# Patient Record
Sex: FEMALE | Race: WHITE | NOT HISPANIC OR LATINO | Employment: OTHER | URBAN - METROPOLITAN AREA
[De-identification: names, ages, dates, MRNs, and addresses within clinical notes are randomized per-mention and may not be internally consistent; named-entity substitution may affect disease eponyms.]

---

## 2017-04-05 ENCOUNTER — ALLSCRIPTS OFFICE VISIT (OUTPATIENT)
Dept: OTHER | Facility: OTHER | Age: 67
End: 2017-04-05

## 2018-01-13 VITALS
DIASTOLIC BLOOD PRESSURE: 80 MMHG | SYSTOLIC BLOOD PRESSURE: 128 MMHG | WEIGHT: 145.94 LBS | HEIGHT: 68 IN | BODY MASS INDEX: 22.12 KG/M2 | HEART RATE: 76 BPM

## 2019-07-24 ENCOUNTER — DOCTOR'S OFFICE (OUTPATIENT)
Dept: URBAN - METROPOLITAN AREA CLINIC 137 | Facility: CLINIC | Age: 69
Setting detail: OPHTHALMOLOGY
End: 2019-07-24
Payer: COMMERCIAL

## 2019-07-24 DIAGNOSIS — G43.101: ICD-10-CM

## 2019-07-24 PROCEDURE — 99202 OFFICE O/P NEW SF 15 MIN: CPT | Performed by: OPTOMETRIST

## 2019-07-24 ASSESSMENT — REFRACTION_MANIFEST
OS_VA1: 20/
OD_VA3: 20/
OD_VA3: 20/
OS_VA3: 20/
OD_VA2: 20/
OD_VA1: 20/
OU_VA: 20/
OS_VA1: 20/
OU_VA: 20/
OS_VA2: 20/
OS_VA3: 20/
OD_VA1: 20/
OS_VA2: 20/
OD_VA2: 20/

## 2019-07-24 ASSESSMENT — REFRACTION_CURRENTRX
OS_OVR_VA: 20/
OD_CYLINDER: -0.75
OS_SPHERE: +2.50
OS_OVR_VA: 20/
OD_VPRISM_DIRECTION: SV
OD_AXIS: 90
OD_OVR_VA: 20/
OD_SPHERE: +2.00
OD_OVR_VA: 20/
OS_VPRISM_DIRECTION: SV
OS_AXIS: 92
OS_CYLINDER: -0.50
OS_OVR_VA: 20/
OD_OVR_VA: 20/

## 2019-07-24 ASSESSMENT — VISUAL ACUITY
OD_BCVA: 20/25-3
OS_BCVA: 20/25-1

## 2019-07-24 ASSESSMENT — CONFRONTATIONAL VISUAL FIELD TEST (CVF)
OD_FINDINGS: FULL
OS_FINDINGS: FULL

## 2019-08-19 ENCOUNTER — DOCTOR'S OFFICE (OUTPATIENT)
Dept: URBAN - METROPOLITAN AREA CLINIC 137 | Facility: CLINIC | Age: 69
Setting detail: OPHTHALMOLOGY
End: 2019-08-19

## 2019-08-19 ENCOUNTER — OPTICAL OFFICE (OUTPATIENT)
Dept: URBAN - METROPOLITAN AREA CLINIC 146 | Facility: CLINIC | Age: 69
Setting detail: OPHTHALMOLOGY
End: 2019-08-19

## 2019-08-19 DIAGNOSIS — H52.223: ICD-10-CM

## 2019-08-19 DIAGNOSIS — H52.4: ICD-10-CM

## 2019-08-19 DIAGNOSIS — H52.03: ICD-10-CM

## 2019-08-19 PROCEDURE — V2744 TINT PHOTOCHROMATIC LENS/ES: HCPCS | Performed by: OPTOMETRIST

## 2019-08-19 PROCEDURE — 92015 DETERMINE REFRACTIVE STATE: CPT | Performed by: OPTOMETRIST

## 2019-08-19 PROCEDURE — V2020 VISION SVCS FRAMES PURCHASES: HCPCS | Performed by: OPTOMETRIST

## 2019-08-19 PROCEDURE — V2781 PROGRESSIVE LENS PER LENS: HCPCS | Performed by: OPTOMETRIST

## 2019-08-19 PROCEDURE — V2750 ANTI-REFLECTIVE COATING: HCPCS | Performed by: OPTOMETRIST

## 2019-08-19 ASSESSMENT — REFRACTION_MANIFEST
OS_VA3: 20/
OS_CYLINDER: -1.00
OS_AXIS: 95
OS_ADD: +2.25
OS_VA1: 20/
OS_SPHERE: +2.50
OU_VA: 20/
OD_VA3: 20/
OS_VA1: 20/20-
OD_CYLINDER: -0.50
OD_VA2: 20/
OD_VA1: 20/20-
OS_VA3: 20/
OD_SPHERE: +1.50
OD_VA3: 20/
OD_ADD: +2.25
OS_VA2: 20/
OD_AXIS: 100
OD_VA2: 20/
OS_VA2: 20/
OD_VA1: 20/
OU_VA: 20/

## 2019-08-19 ASSESSMENT — REFRACTION_CURRENTRX
OD_OVR_VA: 20/
OS_AXIS: 92
OD_AXIS: 94
OS_VPRISM_DIRECTION: SV
OS_CYLINDER: -0.75
OS_OVR_VA: 20/
OD_VPRISM_DIRECTION: SV
OD_OVR_VA: 20/
OD_SPHERE: +2.00
OD_OVR_VA: 20/
OS_OVR_VA: 20/
OS_OVR_VA: 20/
OD_CYLINDER: -0.75
OS_SPHERE: +2.50

## 2019-08-19 ASSESSMENT — REFRACTION_AUTOREFRACTION
OS_CYLINDER: -0.25
OD_CYLINDER: -0.25
OD_SPHERE: +1.50
OS_AXIS: 105
OD_AXIS: 82
OS_SPHERE: +2.00

## 2019-08-19 ASSESSMENT — SPHEQUIV_DERIVED
OD_SPHEQUIV: 1.375
OD_SPHEQUIV: 1.25
OS_SPHEQUIV: 1.875
OS_SPHEQUIV: 2

## 2019-08-19 ASSESSMENT — VISUAL ACUITY
OS_BCVA: 20/25-1
OD_BCVA: 20/30

## 2019-08-19 ASSESSMENT — CONFRONTATIONAL VISUAL FIELD TEST (CVF)
OS_FINDINGS: FULL
OD_FINDINGS: FULL

## 2020-01-15 ENCOUNTER — HOSPITAL ENCOUNTER (OUTPATIENT)
Dept: RADIOLOGY | Facility: HOSPITAL | Age: 70
Discharge: HOME | End: 2020-01-15
Attending: FAMILY MEDICINE
Payer: MEDICARE

## 2020-01-15 ENCOUNTER — TRANSCRIBE ORDERS (OUTPATIENT)
Dept: ADMINISTRATIVE | Facility: HOSPITAL | Age: 70
End: 2020-01-15

## 2020-01-15 DIAGNOSIS — M79.645 PAIN IN FINGER OF LEFT HAND: Primary | ICD-10-CM

## 2020-01-15 DIAGNOSIS — M79.645 PAIN IN FINGER OF LEFT HAND: ICD-10-CM

## 2020-01-15 PROCEDURE — 73140 X-RAY EXAM OF FINGER(S): CPT

## 2020-02-26 ENCOUNTER — OFFICE VISIT (OUTPATIENT)
Dept: OBGYN CLINIC | Facility: HOSPITAL | Age: 70
End: 2020-02-26
Payer: MEDICARE

## 2020-02-26 ENCOUNTER — OFFICE VISIT (OUTPATIENT)
Dept: OCCUPATIONAL THERAPY | Facility: HOSPITAL | Age: 70
End: 2020-02-26
Payer: MEDICARE

## 2020-02-26 VITALS
SYSTOLIC BLOOD PRESSURE: 139 MMHG | DIASTOLIC BLOOD PRESSURE: 86 MMHG | BODY MASS INDEX: 25.01 KG/M2 | WEIGHT: 165 LBS | HEIGHT: 68 IN | HEART RATE: 69 BPM

## 2020-02-26 DIAGNOSIS — M65.332 TRIGGER FINGER, LEFT MIDDLE FINGER: Primary | ICD-10-CM

## 2020-02-26 DIAGNOSIS — M65.341 TRIGGER FINGER, RIGHT RING FINGER: ICD-10-CM

## 2020-02-26 DIAGNOSIS — M65.332 TRIGGER FINGER, LEFT MIDDLE FINGER: ICD-10-CM

## 2020-02-26 PROCEDURE — 99203 OFFICE O/P NEW LOW 30 MIN: CPT | Performed by: ORTHOPAEDIC SURGERY

## 2020-02-26 PROCEDURE — L3933 FO W/O JOINTS CF: HCPCS

## 2020-02-26 RX ORDER — CARVEDILOL 6.25 MG/1
TABLET ORAL
COMMUNITY
Start: 2020-02-23

## 2020-02-26 NOTE — PROGRESS NOTES
Orthosis    Diagnosis:   1  Trigger finger, left middle finger  Ambulatory referral to PT/OT hand therapy   2  Trigger finger, right ring finger  Ambulatory referral to PT/OT hand therapy     Indication: Motion Blocking    Location: R RF, L LF  Supplies: Custom Fit Orthotic  Orthosis type: MCP flexion blocking  Wearing Schedule: Night only  Describe Position: MCPs in full ext    Precautions: Universal (skin contact/breakdown)    Patient or Caregiver expresses understanding of wearing Schedule and Precautions? Yes  Patient or Caregiver able to don/doff orthotic independently? Yes    Written orders provided to patient?  No  Orders Obtained: Written  Orders Obtained from: Dejan Welch    Return for evaluation and treatment No, HEP given today

## 2020-02-26 NOTE — PATIENT INSTRUCTIONS
What is it TRIGGER FINGER? Stenosing tenosynovitis, commonly known as trigger finger or trigger thumb, involves the pulleys and tendons in the hand that bend the fingers  The tendons work like long ropes connecting the muscles of the forearm with the bones of the fingers and thumb  In the finger, the pulleys are a series of rings that form a tunnel through which the tendons must glide, much like the guides on a fishing barbara through which the line (or tendon) must pass  These pulleys hold the tendons close against the bone  The tendons and the tunnel have a slick lining that allows easy gliding of the tendon through the pulleys (see Figure 1)  Trigger finger/thumb occurs when the pulley at the base of the finger becomes too thick and constricting around the tendon, making it hard for the tendon to move freely through the pulley  Sometimes the tendon develops a nodule (knot) or swelling of its lining  Because of the increased resistance to the gliding of the tendon through the  pulley, one may feel pain, popping, or a catching feeling in the finger or thumb (see Figure 2)  When the tendon catches, it produces irritation and more swelling  This causes a vicious cycle of triggering, irritation, and swelling  Sometimes the finger becomes stuck or locked, and is hard to straighten or bend  What causes it? Causes for this condition are not always clear  Some trigger fingers are associated with medical conditions such as rheumatoid arthritis, gout, and diabetes  Local trauma to the palm/base of the finger may be a factor on occasion, but in most cases there is not a clear cause  Signs and symptoms   Trigger finger/thumb may start with discomfort felt at the base of the finger or thumb, where they join the palm  This area is often tender to local pressure  A nodule may sometimes be found in this area   When the finger begins to trigger or lock, the patient may think the  problem is at the middle knuckle of the finger or the tip knuckle of the thumb, since the tendon that is sticking is the one that moves these joints  Treatment  The goal of treatment in trigger finger/thumb is to eliminate the catching or locking and allow full movement of the finger or thumb without discomfort  Swelling around the flexor tendon and tendon sheath must be reduced to allow smooth gliding of the tendon  The wearing of a splint or taking an oral anti-inflammatory medication may sometimes  help  Treatment may also include changing activities to reduce swelling  An injection of steroid into the area around the tendon and pulley is often effective in relieving the trigger finger/thumb  If non-surgical forms of treatment do not relieve the symptoms, surgery may be recommended  This surgery is performed as an outpatient, usually with simple local anesthesia  The goal of surgery is to open the pulley at the base of the finger so that the tendon can glide more freely (see Figure 3)  Active motion of the finger generally begins immediately after surgery  Normal use of the hand can usually be resumed once comfort permits  Some patients may feel tenderness, discomfort, and swelling about the area of their surgery longer than others  Occasionally, hand therapy is required after surgery to regain  better use  © 2012 [employer]  www handcare  org

## 2020-02-26 NOTE — PROGRESS NOTES
maASSESSMENT/PLAN:    Assessment:   L long and R ring TFs    Plan:   Since pt's symptoms have already improved so much, she would like to start with nighttime splints and HEP  Explained NSAIDs prn, heat and massage are also helpful   Pt was educated on steroid injxs and TFR as well    Follow Up:  PRN    General Discussions:  Trigger FInger: The anatomy and physiology of trigger finger was discussed with the patient today in the office  Edema and increased contact pressure within the flexor tendons at the A1 pulley can cause pain, crepitation, and limitation of function  Treatment options include resting MP blocking splints to decrease edema, oral anti-inflammatory medications, home or formal therapy exercises, up to 2 steroid injections within the tendon sheath, or surgical release  While majority of patients do respond to conservative treatment, up to 20% may require surgical release        _____________________________________________________  CHIEF COMPLAINT:  Chief Complaint   Patient presents with    Left Middle Finger - Locking    Right Ring Finger - Locking         SUBJECTIVE:  Norma Hunt is a 81 y o  female who presents with c/o L long and R ring finger pain, stiffness, and clicking  Pt states she took a medication called Ceravital, but started to having aches and swelling after this so d/c'd it  Since this, the finger pain has improved  In addition, 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  PAST MEDICAL HISTORY:  History reviewed  No pertinent past medical history  PAST SURGICAL HISTORY:  Past Surgical History:   Procedure Laterality Date    APPENDECTOMY      BACK SURGERY         FAMILY HISTORY:  History reviewed  No pertinent family history      SOCIAL HISTORY:  Social History     Tobacco Use    Smoking status: Former Smoker    Smokeless tobacco: Never Used   Substance Use Topics    Alcohol use: Not Currently    Drug use: Not Currently MEDICATIONS:    Current Outpatient Medications:     carvedilol (COREG) 6 25 mg tablet, , Disp: , Rfl:     ALLERGIES:  No Known Allergies    REVIEW OF SYSTEMS:  Pertinent items are noted in HPI  A comprehensive review of systems was negative  LABS:  HgA1c: No results found for: HGBA1C  BMP: No results found for: GLUCOSE, CALCIUM, NA, K, CO2, CL, BUN, CREATININE      _____________________________________________________  PHYSICAL EXAMINATION:  Vital signs: /86   Pulse 69   Ht 5' 7 99" (1 727 m)   Wt 74 8 kg (165 lb)   BMI 25 10 kg/m²   General: well developed and well nourished, alert, oriented times 3 and appears comfortable  Psychiatric: Normal  HEENT: Trachea Midline, No torticollis  Cardiovascular: No discernable arrhythmia  Pulmonary: No wheezing or stridor  Skin: No masses, erythema, lacerations, fluctation, ulcerations  Neurovascular: Sensation Intact to the Median, Ulnar, Radial Nerve, Motor Intact to the Median, Ulnar, Radial Nerve and Pulses Intact    MUSCULOSKELETAL EXAMINATION:  LEFT SIDE:  Finger:  Tenderness A1 pulley long, Triggering  long finger and Nodules  long finger  RIGHT SIDE:  Finger:  Tenderness A1 pulley ring, Triggering  ring finger and Nodules  ring finger  Patient with full composite fist formation bilaterally, no evidence of extensor tendon subluxation  No other areas of triggering to the remaining fingers    Negative Finkelstein, negative carpal tunnel compression test and Tinel's bilaterally, no evidence of CMC arthritis   _____________________________________________________  STUDIES REVIEWED:  Images were reviewd in PACS: Previous xray of the L long finger shows no acute osseous abnormality      PROCEDURES PERFORMED:  Procedures  No Procedures performed today   Scribe Attestation    I,:   Lila Hannah PA-C am acting as a scribe while in the presence of the attending physician :        I,:   Vincenzo Zuniga MD personally performed the services described in this documentation    as scribed in my presence :

## 2020-11-16 ENCOUNTER — DOCTOR'S OFFICE (OUTPATIENT)
Dept: URBAN - METROPOLITAN AREA CLINIC 137 | Facility: CLINIC | Age: 70
Setting detail: OPHTHALMOLOGY
End: 2020-11-16
Payer: COMMERCIAL

## 2020-11-16 DIAGNOSIS — H43.811: ICD-10-CM

## 2020-11-16 PROBLEM — G43.101: Status: ACTIVE | Noted: 2019-07-24

## 2020-11-16 PROBLEM — H52.03 HYPEROPIA; BOTH EYES: Status: ACTIVE | Noted: 2019-08-19

## 2020-11-16 PROBLEM — H43.813 VITREOUS DEGENERATION; BOTH EYES: Status: ACTIVE | Noted: 2019-08-19

## 2020-11-16 PROBLEM — H25.13 CATARACT NUCLEAR SCLEROSIS; BOTH EYES: Status: ACTIVE | Noted: 2019-08-19

## 2020-11-16 PROCEDURE — 92014 COMPRE OPH EXAM EST PT 1/>: CPT | Performed by: OPTOMETRIST

## 2020-11-16 ASSESSMENT — REFRACTION_CURRENTRX
OS_AXIS: 92
OS_OVR_VA: 20/
OD_VPRISM_DIRECTION: SV
OS_VPRISM_DIRECTION: SV
OS_SPHERE: +2.50
OD_OVR_VA: 20/
OD_AXIS: 94
OD_CYLINDER: -0.75
OD_SPHERE: +2.00
OS_CYLINDER: -0.75

## 2020-11-16 ASSESSMENT — REFRACTION_AUTOREFRACTION
OD_CYLINDER: -0.25
OS_SPHERE: +2.00
OS_CYLINDER: -0.25
OD_SPHERE: +1.50
OS_AXIS: 105
OD_AXIS: 82

## 2020-11-16 ASSESSMENT — REFRACTION_MANIFEST
OD_ADD: +2.25
OS_SPHERE: +2.50
OD_SPHERE: +1.50
OD_AXIS: 100
OD_CYLINDER: -0.50
OS_CYLINDER: -1.00
OS_AXIS: 95
OS_VA1: 20/20-
OS_ADD: +2.25
OD_VA1: 20/20-

## 2020-11-16 ASSESSMENT — CONFRONTATIONAL VISUAL FIELD TEST (CVF)
OD_FINDINGS: FULL
OS_FINDINGS: FULL

## 2020-11-16 ASSESSMENT — SPHEQUIV_DERIVED
OS_SPHEQUIV: 2
OD_SPHEQUIV: 1.375
OS_SPHEQUIV: 1.875
OD_SPHEQUIV: 1.25

## 2020-11-16 ASSESSMENT — VISUAL ACUITY
OD_BCVA: 20/30
OS_BCVA: 20/25+2

## 2021-01-28 PROBLEM — H93.A1 PULSATILE TINNITUS OF RIGHT EAR: Status: ACTIVE | Noted: 2021-01-28

## 2021-02-11 ENCOUNTER — TRANSCRIBE ORDERS (OUTPATIENT)
Dept: LAB | Facility: CLINIC | Age: 71
End: 2021-02-11

## 2021-02-11 ENCOUNTER — HOSPITAL ENCOUNTER (OUTPATIENT)
Dept: NON INVASIVE DIAGNOSTICS | Facility: CLINIC | Age: 71
Discharge: HOME | End: 2021-02-11
Payer: MEDICARE

## 2021-02-11 DIAGNOSIS — R09.89 OTHER SPECIFIED SYMPTOMS AND SIGNS INVOLVING THE CIRCULATORY AND RESPIRATORY SYSTEMS: ICD-10-CM

## 2021-02-11 DIAGNOSIS — H93.A1 PULSATILE TINNITUS OF RIGHT EAR: ICD-10-CM

## 2021-02-11 PROCEDURE — 93880 EXTRACRANIAL BILAT STUDY: CPT | Performed by: SURGERY

## 2021-02-11 PROCEDURE — 93880 EXTRACRANIAL BILAT STUDY: CPT

## 2021-02-13 DIAGNOSIS — Z23 ENCOUNTER FOR IMMUNIZATION: ICD-10-CM

## 2021-02-17 ENCOUNTER — HOSPITAL ENCOUNTER (OUTPATIENT)
Dept: CT IMAGING | Facility: HOSPITAL | Age: 71
Discharge: HOME | End: 2021-02-17
Payer: MEDICARE

## 2021-02-17 DIAGNOSIS — H93.A1 PULSATILE TINNITUS OF RIGHT EAR: ICD-10-CM

## 2021-02-17 PROCEDURE — 70496 CT ANGIOGRAPHY HEAD: CPT

## 2021-02-17 PROCEDURE — 70498 CT ANGIOGRAPHY NECK: CPT

## 2021-02-17 PROCEDURE — G1004 CDSM NDSC: HCPCS

## 2021-02-17 RX ADMIN — IOHEXOL 85 ML: 350 INJECTION, SOLUTION INTRAVENOUS at 20:10

## 2021-02-23 ENCOUNTER — TELEPHONE (OUTPATIENT)
Dept: VASCULAR SURGERY | Facility: CLINIC | Age: 71
End: 2021-02-23

## 2021-02-24 ENCOUNTER — CONSULT (OUTPATIENT)
Dept: VASCULAR SURGERY | Facility: CLINIC | Age: 71
End: 2021-02-24
Payer: MEDICARE

## 2021-02-24 VITALS
BODY MASS INDEX: 23.72 KG/M2 | DIASTOLIC BLOOD PRESSURE: 92 MMHG | TEMPERATURE: 98.4 F | HEART RATE: 78 BPM | WEIGHT: 147.6 LBS | SYSTOLIC BLOOD PRESSURE: 156 MMHG | HEIGHT: 66 IN

## 2021-02-24 DIAGNOSIS — Z82.49 FAMILY HISTORY OF ABDOMINAL AORTIC ANEURYSM (AAA): ICD-10-CM

## 2021-02-24 DIAGNOSIS — H93.A1 PULSATILE TINNITUS OF RIGHT EAR: Primary | ICD-10-CM

## 2021-02-24 DIAGNOSIS — I10 ESSENTIAL HYPERTENSION: ICD-10-CM

## 2021-02-24 PROCEDURE — 99203 OFFICE O/P NEW LOW 30 MIN: CPT | Performed by: SURGERY

## 2021-02-24 RX ORDER — ESCITALOPRAM OXALATE 10 MG/1
TABLET ORAL
COMMUNITY
Start: 2021-02-18

## 2021-02-24 NOTE — PROGRESS NOTES
Assessment/Plan:    Family history of abdominal aortic aneurysm (AAA)    Prior history of smoking and family history of abdominal aortic aneurysm, will order a screening ultrasound  Hypertension    Daily exercise and controlled weight  He could have white coat hypertension which could cause elevation of blood pressure in doctor's office  Pulsatile tinnitus of right ear    I have reviewed the CT angiogram and carotid Doppler in great detail  I do not see any particular vascular cause that could be causing this  There is no AV all formation or aneurysm  There is no significant carotid artery stenosis  reassurance provided to patient  Total time counseling patient of the imaging findings and further course of action is 40 minutes  Diagnoses and all orders for this visit:    Pulsatile tinnitus of right ear    Essential hypertension  -     CT coronary calcium score; Future    Family history of abdominal aortic aneurysm (AAA)  -     US ABDOMINAL AORTA SCREENING AAA; Future    Other orders  -     escitalopram (LEXAPRO) 10 mg tablet          Subjective:      Patient ID: Thu Grade is a 78 y o  female  Pt is new to our office  Pt was referred here by Dr Go El (ENT)     HPI    Since the last 3 months patient is having ringing sound behind her right ear  It is a washing pulsating sound that she can hear  It usually happens at night  In day-to-day activity she does not hear it  It started after an episode of inner ear infection  Patient has had multiple ear infections over the years  She has had extensive ENT workup done  She also then had a carotid Doppler and a CT angiogram of the head and neck  Patient is here to discuss the findings  The sound continues to happen on a regular basis  She denies any improvement  She denies any worsening  There are no significant past radiating factors that started this or and this   No neuro symptoms such as sudden upper or lower ext weakness, numbness, aphasia, dysarthria, imbalance  Patient has a family history of abdominal aortic aneurysm  Her father passed away from aneurysmal disease, she is unclear of what was the etiology  She also has hypertension  This increases when she is at the doctor's office  She takes Coreg for rate  It improves when she exercises and keeps her weight under control  The following portions of the patient's history were reviewed and updated as appropriate: allergies, current medications, past family history, past medical history, past social history, past surgical history and problem list     Review of Systems   Constitutional: Negative  HENT: Positive for ear pain and tinnitus  Eyes: Negative  Respiratory: Negative  Cardiovascular: Negative  Gastrointestinal: Negative  Endocrine: Negative  Genitourinary: Positive for frequency  Musculoskeletal: Positive for arthralgias, back pain and myalgias  Skin: Negative  Allergic/Immunologic: Negative  Neurological: Negative  Hematological: Negative  Psychiatric/Behavioral: The patient is nervous/anxious  Depression       I have reviewed the review of systems as entered and made appropriate changes as necessary    Objective:      /92 (BP Location: Right arm, Patient Position: Sitting, Cuff Size: Standard)   Pulse 78   Temp 98 4 °F (36 9 °C) (Tympanic)   Ht 5' 5 5" (1 664 m)   Wt 67 kg (147 lb 9 6 oz)   BMI 24 19 kg/m²          Physical Exam  Vitals signs and nursing note reviewed  Constitutional:       Appearance: Normal appearance  HENT:      Head: Normocephalic and atraumatic  Right Ear: External ear normal       Left Ear: External ear normal       Ears:      Comments: No pulsatile masses  Near the ear lobe  Neck:      Musculoskeletal: Normal range of motion and neck supple  No neck rigidity or muscular tenderness  Vascular: No carotid bruit     Cardiovascular:      Rate and Rhythm: Normal rate and regular rhythm  Pulses: Normal pulses  Skin:     Capillary Refill: Capillary refill takes less than 2 seconds  Neurological:      General: No focal deficit present  Mental Status: She is alert and oriented to person, place, and time     Psychiatric:         Mood and Affect: Mood normal          Behavior: Behavior normal

## 2021-02-24 NOTE — ASSESSMENT & PLAN NOTE
Prior history of smoking and family history of abdominal aortic aneurysm, will order a screening ultrasound

## 2021-02-24 NOTE — ASSESSMENT & PLAN NOTE
I have reviewed the CT angiogram and carotid Doppler in great detail  I do not see any particular vascular cause that could be causing this  There is no AV all formation or aneurysm  There is no significant carotid artery stenosis  reassurance provided to patient  Total time counseling patient of the imaging findings and further course of action is 40 minutes

## 2021-03-07 ENCOUNTER — IMMUNIZATIONS (OUTPATIENT)
Dept: FAMILY MEDICINE CLINIC | Facility: HOSPITAL | Age: 71
End: 2021-03-07

## 2021-03-07 DIAGNOSIS — Z23 ENCOUNTER FOR IMMUNIZATION: Primary | ICD-10-CM

## 2021-03-07 PROCEDURE — 91300 SARS-COV-2 / COVID-19 MRNA VACCINE (PFIZER-BIONTECH) 30 MCG: CPT

## 2021-03-07 PROCEDURE — 0001A SARS-COV-2 / COVID-19 MRNA VACCINE (PFIZER-BIONTECH) 30 MCG: CPT

## 2021-03-09 ENCOUNTER — HOSPITAL ENCOUNTER (OUTPATIENT)
Dept: CT IMAGING | Facility: HOSPITAL | Age: 71
Discharge: HOME | End: 2021-03-09
Attending: SURGERY
Payer: MEDICARE

## 2021-03-09 ENCOUNTER — HOSPITAL ENCOUNTER (OUTPATIENT)
Dept: ULTRASOUND IMAGING | Facility: HOSPITAL | Age: 71
Discharge: HOME | End: 2021-03-09
Attending: SURGERY
Payer: MEDICARE

## 2021-03-09 DIAGNOSIS — I10 ESSENTIAL HYPERTENSION: ICD-10-CM

## 2021-03-09 DIAGNOSIS — Z82.49 FAMILY HISTORY OF ABDOMINAL AORTIC ANEURYSM (AAA): ICD-10-CM

## 2021-03-09 PROCEDURE — G1004 CDSM NDSC: HCPCS

## 2021-03-09 PROCEDURE — 75571 CT HRT W/O DYE W/CA TEST: CPT

## 2021-03-09 PROCEDURE — 76706 US ABDL AORTA SCREEN AAA: CPT

## 2021-03-12 ENCOUNTER — TELEPHONE (OUTPATIENT)
Dept: VASCULAR SURGERY | Facility: CLINIC | Age: 71
End: 2021-03-12

## 2021-03-12 NOTE — TELEPHONE ENCOUNTER
Spoke with patient and explained scheduling OV or telemed visit  Transferred to call center to set up

## 2021-03-12 NOTE — TELEPHONE ENCOUNTER
Patient called for interpretation of the coronary calcium test result that dr Prince Casrto had ordered  She read her results in my chart , and she knows that her calcium is too high, but she is asking for an interpretations of her results  I will ask Tu Cordova PA-C to explain   Patient can be reached at [phone]

## 2021-03-14 DIAGNOSIS — R93.1 AGATSTON CORONARY ARTERY CALCIUM SCORE GREATER THAN 400: Primary | ICD-10-CM

## 2021-03-15 NOTE — RESULT ENCOUNTER NOTE
Coronary calcium score is very elevated  She should be started on aspirin and statin  I have referred her to see a cardiologist  Thanks

## 2021-03-17 ENCOUNTER — DOCUMENTATION (OUTPATIENT)
Dept: OTHER | Facility: HOSPITAL | Age: 71
End: 2021-03-17

## 2021-03-17 NOTE — PROGRESS NOTES
Clinical Trial Consent Note  Clinical Trial: [employer] Ultrasound Performance Evaluation study  : Dr Rody Chowdary  Fercho Vasquez was approached on 03/09/2021 for consent to enroll into the [employer] Ultrasound Performance Evaluation study  Consent form was reviewed with the patient, patient was given the opportunity to ask questions  All the patient's questions were answered  The patient verbalized understanding of the study, agreed to proceed with study activities, and signed & dated the informed consent  A copy was provided to the patient for their own future reference  The patient will now begin study as outlined in protocol  See additional documentation for associated visit activities completed  Documentation of Informed Consent Process for Clinical Research Study    Element of Informed Consent Discussed Date Initials/ Person obtaining consent   A statement that the study involves research, an explanation of the purposes of the research and the expected duration of the subject's participation, a description of the procedures to be followed, and identification of any procedures which are experimental 3/09/2021 [facility]   A description of any reasonably foreseeable risks or discomforts to the subject  3/09/2021 [facility]   A description of any benefits to the subject or to others which may reasonably be expected from the research  3/09/2021 [facility]   A disclosure of appropriate alternative procedures or courses of treatment, if any, that might be advantageous to the subject   3/09/2021 [facility]   A statement describing the extent, if any, to which confidentiality of records identifying the subject will be maintained and that notes the possibility that the Food and Drug Administration may inspect the records 3/09/2021 [facility]   For research involving more than minimal risk, an explanation as to whether any compensation and an explanation as to whether any medical treatments are available if injury occurs and, if so, what they consist of, or where further information may be obtained  3/09/2021 [facility]   An explanation of whom to contact for answers to pertinent questions about the research and research subjects' rights, and whom to contact in the event3/09/2021 of a research-related injury to the subject  3/09/2021 [address]   A statement that participation is voluntary, that refusal to participate will involve no penalty or loss of benefits to which the subject is otherwise entitled, and that the subject may discontinue participation at any time without penalty or loss of benefits to which the subject is otherwise entitled  3/09/2021 [facility]   A statement that the particular treatment or procedure may involve risks to the subject (or to the embryo or fetus, if the subject is or may become pregnant) which are currently unforeseeable 3/09/2021 [address]   Anticipated circumstances under which the subject's participation may be terminated by the investigator without regard to the subject's consent  3/09/2021 [facility]   Any additional costs to the subject that may result from participation in the research 3/09/2021 [address]   The consequences of a subjects' decision to withdraw from the research and procedures for orderly termination of participation by the subject  3/09/2021 [facility]   A statement that significant new findings developed during the course of the research which may relate to the subject's willingness to continue participation will be provided to the subject  3/09/2021 [facility]   The approximate number of subjects involved in the study  [address]       Study title:      [employer] Ultrasound Performance Evaluation Study   This signed and dated document shall serve as certification that all of the below listed required elements of informed consent were provided to the subject or legally authorized representative signing the actual Informed Consent Document, both in written and verbal format       The HIPAA consent is contained within the Informed Consent document and has also been discussed  A copy of the actual signed and dated Informed Consent has also been provided to the subject or legally authorized representative, and the original signed document shall be maintained in the research shadow chart  The patient speaks, reads and understands English?  _X_ Y__N     If NO, Name of :___________________________________      speaks, reads, and understands English?     _X_ Y __N     Process utilized to obtain consent:____________________________________________________    Subject meets eligibility criteria as outline in the current protocol? _X_Y __N    __ N/A - Reason: ___________________________________________________________    The protocol consent was reviewed with the patient and all questions were addressed and answered?    _X_ Y __N      The subject agreed to participate and the consent was signed and dated? __Y __N      Consent was obtained prior to any research procedures being performed?     __Y __N        Date  IC[facility] signed ___[id number]___________________________          Certification of Person Conducting the Consent Process:                                                                                _____SUSAN HAHN________________________________  Printed Name      ___Susan Hahn________________________ __3/09/2021_______  Signature      Date

## 2021-03-28 ENCOUNTER — IMMUNIZATIONS (OUTPATIENT)
Dept: FAMILY MEDICINE CLINIC | Facility: HOSPITAL | Age: 71
End: 2021-03-28

## 2021-03-28 DIAGNOSIS — Z23 ENCOUNTER FOR IMMUNIZATION: Primary | ICD-10-CM

## 2021-03-28 PROCEDURE — 0002A SARS-COV-2 / COVID-19 MRNA VACCINE (PFIZER-BIONTECH) 30 MCG: CPT

## 2021-03-28 PROCEDURE — 91300 SARS-COV-2 / COVID-19 MRNA VACCINE (PFIZER-BIONTECH) 30 MCG: CPT

## 2021-04-14 ENCOUNTER — OFFICE VISIT (OUTPATIENT)
Dept: VASCULAR SURGERY | Facility: CLINIC | Age: 71
End: 2021-04-14
Payer: MEDICARE

## 2021-04-14 VITALS
TEMPERATURE: 97.4 F | BODY MASS INDEX: 22.98 KG/M2 | WEIGHT: 143 LBS | SYSTOLIC BLOOD PRESSURE: 120 MMHG | HEART RATE: 70 BPM | DIASTOLIC BLOOD PRESSURE: 82 MMHG | HEIGHT: 66 IN

## 2021-04-14 DIAGNOSIS — Z82.49 FAMILY HISTORY OF ABDOMINAL AORTIC ANEURYSM (AAA): ICD-10-CM

## 2021-04-14 DIAGNOSIS — R93.1 AGATSTON CORONARY ARTERY CALCIUM SCORE GREATER THAN 400: Primary | ICD-10-CM

## 2021-04-14 DIAGNOSIS — H93.A1 PULSATILE TINNITUS OF RIGHT EAR: ICD-10-CM

## 2021-04-14 PROCEDURE — 99213 OFFICE O/P EST LOW 20 MIN: CPT | Performed by: SURGERY

## 2021-04-14 RX ORDER — ASPIRIN 81 MG/1
81 TABLET ORAL DAILY
Qty: 180 TABLET | Refills: 1
Start: 2021-04-14 | End: 2021-07-13

## 2021-04-14 NOTE — PATIENT INSTRUCTIONS
Daily exercise  consider 81mg aspirin Dary   Consider low dose statin treatment  Follow up with cardiologist

## 2021-04-14 NOTE — ASSESSMENT & PLAN NOTE
Doppler reviewed  No evidence of abdominal aortic aneurysm    Findings of test results discussed with patient

## 2021-04-14 NOTE — PROGRESS NOTES
Assessment/Plan:    Agatston coronary artery calcium score greater than 400   I have reviewed the coronary calcium CT scan findings with the pre a I explained the results in detail including its value as a screening test   Based on the elevated coronary scoring I recommend initiation of daily 81 mg aspirin and low-dose statin  Patient would like to discuss further with cardiologist regarding this  I have given her referral to see Dr Pastor Adkins  Who she has seen about 10 years ago  She denies any limitation in her activity level or any shortness of breath on activity  She has been very active physically including with yd work and dancing  Family history of abdominal aortic aneurysm (AAA)   Doppler reviewed  No evidence of abdominal aortic aneurysm  Findings of test results discussed with patient     total time spent including review of test results and answering all questions is 15 minutes  Diagnoses and all orders for this visit:    Agatston coronary artery calcium score greater than 400  -     aspirin (ECOTRIN LOW STRENGTH) 81 mg EC tablet; Take 1 tablet (81 mg total) by mouth daily  -     Ambulatory referral to Cardiology; Future    Pulsatile tinnitus of right ear  -     Ambulatory referral to Vascular Surgery    Family history of abdominal aortic aneurysm (AAA)          Subjective:      Patient ID: Aneudy Real is a 78 y o  female  Patient presents today for f/u visit to review CT coronary calcium score  HPI   patient denies any change from last office visit  Continues with her day-to-day activity including strenuous work in the garden such as mulching and also dancing without any shortness of breath or chest pain   The following portions of the patient's history were reviewed and updated as appropriate: allergies, current medications, past family history, past medical history, past social history, past surgical history and problem list     Review of Systems   Constitutional: Negative  HENT: Positive for tinnitus  Eyes: Negative  Respiratory: Negative  Cardiovascular: Negative  Gastrointestinal: Negative  Endocrine: Negative  Genitourinary: Negative  Musculoskeletal: Negative  Skin: Negative  Allergic/Immunologic: Negative  Neurological: Negative  Hematological: Negative  Psychiatric/Behavioral: Negative  I have reviewed the review of systems as entered and made appropriate changes as necessary    Objective:      /82 (BP Location: Right arm, Patient Position: Sitting)   Pulse 70   Temp (!) 97 4 °F (36 3 °C) (Tympanic)   Ht 5' 5 5" (1 664 m)   Wt 64 9 kg (143 lb)   BMI 23 43 kg/m²          Physical Exam  Vitals signs and nursing note reviewed  Constitutional:       Appearance: Normal appearance  HENT:      Head: Normocephalic and atraumatic  Right Ear: External ear normal       Left Ear: External ear normal       Ears:      Comments: No pulsatile masses  Near the ear lobe  Neck:      Musculoskeletal: Normal range of motion and neck supple  No neck rigidity or muscular tenderness  Vascular: No carotid bruit  Cardiovascular:      Rate and Rhythm: Normal rate and regular rhythm  Pulses: Normal pulses  Skin:     Capillary Refill: Capillary refill takes less than 2 seconds  Neurological:      General: No focal deficit present  Mental Status: She is alert and oriented to person, place, and time     Psychiatric:         Mood and Affect: Mood normal          Behavior: Behavior normal

## 2021-04-14 NOTE — ASSESSMENT & PLAN NOTE
I have reviewed the coronary calcium CT scan findings with the pre a I explained the results in detail including its value as a screening test   Based on the elevated coronary scoring I recommend initiation of daily 81 mg aspirin and low-dose statin  Patient would like to discuss further with cardiologist regarding this  I have given her referral to see Dr Tom Hensley  Who she has seen about 10 years ago  She denies any limitation in her activity level or any shortness of breath on activity  She has been very active physically including with yd work and dancing

## 2021-05-29 DIAGNOSIS — K21.9 GASTROESOPHAGEAL REFLUX DISEASE WITHOUT ESOPHAGITIS: ICD-10-CM

## 2021-05-29 RX ORDER — OMEPRAZOLE 40 MG/1
40 CAPSULE, DELAYED RELEASE ORAL DAILY
Qty: 90 CAPSULE | Refills: 1 | Status: SHIPPED | OUTPATIENT
Start: 2021-05-29 | End: 2022-02-21

## 2021-09-07 ENCOUNTER — APPOINTMENT (OUTPATIENT)
Dept: LAB | Facility: CLINIC | Age: 71
End: 2021-09-07
Payer: MEDICARE

## 2021-09-07 DIAGNOSIS — R93.1 ABNORMAL ECHOCARDIOGRAM: ICD-10-CM

## 2021-09-07 DIAGNOSIS — I10 HYPERTENSION, UNSPECIFIED TYPE: ICD-10-CM

## 2021-09-07 DIAGNOSIS — M54.50 LOW BACK PAIN, UNSPECIFIED BACK PAIN LATERALITY, UNSPECIFIED CHRONICITY, UNSPECIFIED WHETHER SCIATICA PRESENT: ICD-10-CM

## 2021-09-07 DIAGNOSIS — E78.9 DISORDER OF LIPOPROTEIN AND LIPID METABOLISM: ICD-10-CM

## 2021-09-07 LAB
ALBUMIN SERPL BCP-MCNC: 3.7 G/DL (ref 3.5–5)
ALP SERPL-CCNC: 74 U/L (ref 46–116)
ALT SERPL W P-5'-P-CCNC: 21 U/L (ref 12–78)
ANION GAP SERPL CALCULATED.3IONS-SCNC: 5 MMOL/L (ref 4–13)
AST SERPL W P-5'-P-CCNC: 12 U/L (ref 5–45)
BASOPHILS # BLD AUTO: 0.04 THOUSANDS/ΜL (ref 0–0.1)
BASOPHILS NFR BLD AUTO: 1 % (ref 0–1)
BILIRUB SERPL-MCNC: 0.63 MG/DL (ref 0.2–1)
BUN SERPL-MCNC: 22 MG/DL (ref 5–25)
CALCIUM SERPL-MCNC: 9.2 MG/DL (ref 8.3–10.1)
CHLORIDE SERPL-SCNC: 106 MMOL/L (ref 100–108)
CHOLEST SERPL-MCNC: 174 MG/DL (ref 50–200)
CO2 SERPL-SCNC: 30 MMOL/L (ref 21–32)
CREAT SERPL-MCNC: 0.93 MG/DL (ref 0.6–1.3)
EOSINOPHIL # BLD AUTO: 0.14 THOUSAND/ΜL (ref 0–0.61)
EOSINOPHIL NFR BLD AUTO: 3 % (ref 0–6)
ERYTHROCYTE [DISTWIDTH] IN BLOOD BY AUTOMATED COUNT: 13.2 % (ref 11.6–15.1)
GFR SERPL CREATININE-BSD FRML MDRD: 63 ML/MIN/1.73SQ M
GLUCOSE P FAST SERPL-MCNC: 91 MG/DL (ref 65–99)
HCT VFR BLD AUTO: 43.9 % (ref 34.8–46.1)
HDLC SERPL-MCNC: 74 MG/DL
HGB BLD-MCNC: 14.3 G/DL (ref 11.5–15.4)
IMM GRANULOCYTES # BLD AUTO: 0 THOUSAND/UL (ref 0–0.2)
IMM GRANULOCYTES NFR BLD AUTO: 0 % (ref 0–2)
LDLC SERPL CALC-MCNC: 87 MG/DL (ref 0–100)
LYMPHOCYTES # BLD AUTO: 1.7 THOUSANDS/ΜL (ref 0.6–4.47)
LYMPHOCYTES NFR BLD AUTO: 41 % (ref 14–44)
MCH RBC QN AUTO: 29.9 PG (ref 26.8–34.3)
MCHC RBC AUTO-ENTMCNC: 32.6 G/DL (ref 31.4–37.4)
MCV RBC AUTO: 92 FL (ref 82–98)
MONOCYTES # BLD AUTO: 0.27 THOUSAND/ΜL (ref 0.17–1.22)
MONOCYTES NFR BLD AUTO: 7 % (ref 4–12)
NEUTROPHILS # BLD AUTO: 2.03 THOUSANDS/ΜL (ref 1.85–7.62)
NEUTS SEG NFR BLD AUTO: 48 % (ref 43–75)
NONHDLC SERPL-MCNC: 100 MG/DL
NRBC BLD AUTO-RTO: 0 /100 WBCS
PLATELET # BLD AUTO: 173 THOUSANDS/UL (ref 149–390)
PMV BLD AUTO: 10 FL (ref 8.9–12.7)
POTASSIUM SERPL-SCNC: 4.5 MMOL/L (ref 3.5–5.3)
PROT SERPL-MCNC: 6.9 G/DL (ref 6.4–8.2)
RBC # BLD AUTO: 4.79 MILLION/UL (ref 3.81–5.12)
SODIUM SERPL-SCNC: 141 MMOL/L (ref 136–145)
TRIGL SERPL-MCNC: 64 MG/DL
WBC # BLD AUTO: 4.18 THOUSAND/UL (ref 4.31–10.16)

## 2021-09-07 PROCEDURE — 36415 COLL VENOUS BLD VENIPUNCTURE: CPT

## 2021-09-07 PROCEDURE — 85025 COMPLETE CBC W/AUTO DIFF WBC: CPT

## 2021-09-07 PROCEDURE — 86618 LYME DISEASE ANTIBODY: CPT

## 2021-09-07 PROCEDURE — 80053 COMPREHEN METABOLIC PANEL: CPT

## 2021-09-07 PROCEDURE — 80061 LIPID PANEL: CPT

## 2021-09-08 LAB — B BURGDOR IGG+IGM SER-ACNC: 25

## 2021-12-02 ENCOUNTER — HOSPITAL ENCOUNTER (OUTPATIENT)
Dept: CT IMAGING | Facility: HOSPITAL | Age: 71
Discharge: HOME | End: 2021-12-02
Attending: STUDENT IN AN ORGANIZED HEALTH CARE EDUCATION/TRAINING PROGRAM
Payer: MEDICARE

## 2021-12-02 DIAGNOSIS — J32.9 CHRONIC RHINOSINUSITIS: ICD-10-CM

## 2021-12-02 DIAGNOSIS — J31.0 CHRONIC RHINOSINUSITIS: ICD-10-CM

## 2021-12-02 PROCEDURE — G1004 CDSM NDSC: HCPCS

## 2021-12-02 PROCEDURE — 70486 CT MAXILLOFACIAL W/O DYE: CPT

## 2022-02-20 DIAGNOSIS — K21.9 GASTROESOPHAGEAL REFLUX DISEASE WITHOUT ESOPHAGITIS: ICD-10-CM

## 2022-02-21 RX ORDER — OMEPRAZOLE 40 MG/1
CAPSULE, DELAYED RELEASE ORAL
Qty: 90 CAPSULE | Refills: 1 | Status: SHIPPED | OUTPATIENT
Start: 2022-02-21

## 2022-08-19 DIAGNOSIS — K21.9 GASTROESOPHAGEAL REFLUX DISEASE WITHOUT ESOPHAGITIS: ICD-10-CM

## 2022-08-19 RX ORDER — OMEPRAZOLE 40 MG/1
CAPSULE, DELAYED RELEASE ORAL
Qty: 90 CAPSULE | Refills: 1 | Status: SHIPPED | OUTPATIENT
Start: 2022-08-19

## 2023-03-30 ENCOUNTER — HOSPITAL ENCOUNTER (OUTPATIENT)
Dept: RADIOLOGY | Facility: HOSPITAL | Age: 73
End: 2023-03-30

## 2023-03-30 ENCOUNTER — OFFICE VISIT (OUTPATIENT)
Dept: OBGYN CLINIC | Facility: CLINIC | Age: 73
End: 2023-03-30

## 2023-03-30 VITALS — HEART RATE: 72 BPM | HEIGHT: 66 IN | BODY MASS INDEX: 26.07 KG/M2 | WEIGHT: 162.2 LBS | OXYGEN SATURATION: 97 %

## 2023-03-30 DIAGNOSIS — M79.641 BILATERAL HAND PAIN: ICD-10-CM

## 2023-03-30 DIAGNOSIS — M79.641 BILATERAL HAND PAIN: Primary | ICD-10-CM

## 2023-03-30 DIAGNOSIS — M79.642 BILATERAL HAND PAIN: ICD-10-CM

## 2023-03-30 DIAGNOSIS — M79.642 BILATERAL HAND PAIN: Primary | ICD-10-CM

## 2023-03-30 DIAGNOSIS — M18.12 PRIMARY OSTEOARTHRITIS OF FIRST CARPOMETACARPAL JOINT OF LEFT HAND: ICD-10-CM

## 2023-03-30 NOTE — PROGRESS NOTES
"ASSESSMENT/PLAN:    Assessment:   Thumb CMC Arthritis  bilateral    Plan:   Thumb sleeves  We discussed cortisone injections, but right now, she feels her pain is not that bad  She opts to hold off at this time and I agree that is a reasonable plan  Follow Up:  3  month(s) with Dr Zak Goldstein or Dr Ruben Angel    To Do Next Visit:       General Discussions:     [facility] Arthritis: The anatomy and physiology of carpometacarpal joint arthritis was discussed with the patient today in the office  Deterioration of the articular cartilage eventually leads to hypermobility at the thumb [facility] joint, resulting in joint subluxation, osteophyte formation, cystic changes within the trapezium and base of the first metacarpal, as well as subchondral sclerosis  Eventually, pain, limited mobility, and compensatory hyperextension at the metacarpophalangeal joint may develop  While normal activity and usage of the thumb joint may provide a painful experience to the patient, this typically does not result in damage to the thumb or hand  Treatment options include resting thumb spica splints to decreased joint edema, pain, and inflammation  Therapy exercises to strengthen the thenar musculature may relieve pain, but do not alter the overall continued development of osteoarthritis  Oral medications, topical medications, corticosteroid injections may decrease pain and increase overall function  Eventually, approximately 5% of patients may require surgical intervention  _____________________________________________________  CHIEF COMPLAINT:  Chief Complaint   Patient presents with   • Left Hand - Pain     3 weeks- \"Soreness & lumps\"   • Right Hand - Pain     3 weeks- \"Soreness & lumps\"         SUBJECTIVE:  Paul Romo is a 81 y o  female who presents with Pain  Mild  Intermittant  Dull of the bilateral thumb CMC  This started  1 year(s) ago: Insidious      Radiation: None  Previous Treatments: None   Associated symptoms: " increased prominence of the [address] area  Handedness: right  Work status: Does a lot of [employer]    PAST MEDICAL HISTORY:  Past Medical History:   Diagnosis Date   • Hypertension    • Hypertension        PAST SURGICAL HISTORY:  Past Surgical History:   Procedure Laterality Date   • APPENDECTOMY     • BACK SURGERY         FAMILY HISTORY:  Family History   Problem Relation Age of Onset   • Heart attack Father    • Stroke Sister    • Diabetes Maternal Grandmother        SOCIAL HISTORY:  Social History     Tobacco Use   • Smoking status: Former   • Smokeless tobacco: Never   Vaping Use   • Vaping Use: Never used   Substance Use Topics   • Alcohol use: Not Currently   • Drug use: Not Currently       MEDICATIONS:    Current Outpatient Medications:   •  aspirin (ECOTRIN LOW STRENGTH) 81 mg EC tablet, Take 1 tablet (81 mg total) by mouth daily, Disp: 180 tablet, Rfl: 1  •  carvedilol (COREG) 6 25 mg tablet, , Disp: , Rfl:   •  cyclobenzaprine (FLEXERIL) 5 mg tablet, TAKE 1 TABLET BY ORAL ROUTE 3 TIMES EVERY DAY AS NEEDED, Disp: , Rfl:   •  escitalopram (LEXAPRO) 10 mg tablet, , Disp: , Rfl:   •  fluticasone (FLONASE) 50 mcg/act nasal spray, SPRAY 2 SPRAYS INTO EACH NOSTRIL EVERY DAY, Disp: 48 mL, Rfl: 1  •  ipratropium (ATROVENT) 0 03 % nasal spray, 2 sprays into each nostril every 12 (twelve) hours, Disp: 10 mL, Rfl: 1  •  methylprednisolone (Medrol) 4 mg tablet, Take as directed (patient given instructions) (Patient not taking: Reported on 11/23/2021 ), Disp: 30 tablet, Rfl: 0  •  mometasone (NASONEX) 50 mcg/act nasal spray, 2 sprays into each nostril daily, Disp: 17 g, Rfl: 5  •  omeprazole (PriLOSEC) 40 MG capsule, TAKE 1 CAPSULE BY MOUTH EVERY DAY, Disp: 90 capsule, Rfl: 1    ALLERGIES:  No Known Allergies    REVIEW OF SYSTEMS:  Pertinent items are noted in HPI  A comprehensive review of systems was negative      LABS:  HgA1c:   Lab Results   Component Value Date    HGBA1C 5 4 05/27/2021     BMP:   Lab Results   Component "Value Date    CALCIUM 9 2 09/07/2021    K 4 5 09/07/2021    CO2 30 09/07/2021     09/07/2021    BUN 22 09/07/2021    CREATININE 0 93 09/07/2021         _____________________________________________________  PHYSICAL EXAMINATION:  Vital signs: Pulse 72   Ht 5' 5 5\" (1 664 m)   Wt 73 6 kg (162 lb 3 2 oz)   SpO2 97%   BMI 26 58 kg/m²   General: well developed and well nourished, alert, oriented times 3 and appears comfortable  Psychiatric: Normal  HEENT: Trachea Midline, No torticollis  Cardiovascular: Regular rate and rhythm  Pulmonary: No audible wheezing   Abdomen: No guarding  Extremities: No lymphedema  Skin: No masses, erythema, lacerations, fluctation, ulcerations  Neurovascular: Sensation Intact to the Median, Ulnar, Radial Nerve, Motor Intact to the Median, Ulnar, Radial Nerve and Pulses Intact    MUSCULOSKELETAL EXAMINATION:    Bilateral CMC: Positive grind, Positive tendnerness CMC, Positive Shoulder Sign and Positive Hyperextension MP        _____________________________________________________  STUDIES REVIEWED:  Xrays demonstrate b/l CMC OA      PROCEDURES PERFORMED:  Procedures  No Procedures performed today    "

## 2023-08-11 ENCOUNTER — TELEPHONE (OUTPATIENT)
Dept: OBGYN CLINIC | Facility: HOSPITAL | Age: 73
End: 2023-08-11

## 2023-08-11 NOTE — TELEPHONE ENCOUNTER
Caller: Dr. Eliz Wheat office    Doctor: HAND    Reason for call: Patient has deep splinters in her right index finger. Dr. Nicholas Cardenas requesting patient to be seen.       Call back#: [phone]

## 2023-08-11 NOTE — TELEPHONE ENCOUNTER
Caller: Patient    Doctor: HAND    Reason for call: Patient returning call to schedule.       Call back#: [phone]   Patient going to [place] for September

## 2023-08-18 ENCOUNTER — OFFICE VISIT (OUTPATIENT)
Dept: OBGYN CLINIC | Facility: CLINIC | Age: 73
End: 2023-08-18
Payer: MEDICARE

## 2023-08-18 ENCOUNTER — HOSPITAL ENCOUNTER (OUTPATIENT)
Dept: ULTRASOUND IMAGING | Facility: HOSPITAL | Age: 73
Discharge: HOME | End: 2023-08-18
Attending: STUDENT IN AN ORGANIZED HEALTH CARE EDUCATION/TRAINING PROGRAM
Payer: MEDICARE

## 2023-08-18 ENCOUNTER — HOSPITAL ENCOUNTER (OUTPATIENT)
Dept: RADIOLOGY | Facility: HOSPITAL | Age: 73
End: 2023-08-18
Attending: STUDENT IN AN ORGANIZED HEALTH CARE EDUCATION/TRAINING PROGRAM
Payer: MEDICARE

## 2023-08-18 VITALS — WEIGHT: 167.2 LBS | HEIGHT: 66 IN | BODY MASS INDEX: 26.87 KG/M2

## 2023-08-18 DIAGNOSIS — M79.645 PAIN IN LEFT FINGER(S): ICD-10-CM

## 2023-08-18 DIAGNOSIS — S60.459A FOREIGN BODY IN SKIN OF FINGER, INITIAL ENCOUNTER: ICD-10-CM

## 2023-08-18 DIAGNOSIS — L98.9 SKIN LESION: Primary | ICD-10-CM

## 2023-08-18 PROCEDURE — 99214 OFFICE O/P EST MOD 30 MIN: CPT | Performed by: STUDENT IN AN ORGANIZED HEALTH CARE EDUCATION/TRAINING PROGRAM

## 2023-08-18 PROCEDURE — 76882 US LMTD JT/FCL EVL NVASC XTR: CPT

## 2023-08-18 PROCEDURE — 73140 X-RAY EXAM OF FINGER(S): CPT

## 2023-08-18 RX ORDER — ROSUVASTATIN CALCIUM 10 MG/1
10 TABLET, COATED ORAL DAILY
COMMUNITY
Start: 2023-07-24

## 2023-08-18 RX ORDER — MONTELUKAST SODIUM 10 MG/1
10 TABLET ORAL EVERY EVENING
COMMUNITY
Start: 2023-08-07

## 2023-08-18 NOTE — PROGRESS NOTES
ORTHOPAEDIC HAND, WRIST, AND ELBOW OFFICE  VISIT       ASSESSMENT/PLAN:      Diagnoses and all orders for this visit:    Skin lesion  -     Ambulatory Referral to Dermatology; Future    Pain in left finger(s)  -     XR finger left second digit-index; Future    Foreign body in skin of finger, initial encounter  -     [address] [facility]; Future    Other orders  -     sertraline (ZOLOFT) 50 mg tablet  -     rosuvastatin (CRESTOR) 10 MG tablet; Take 10 mg by mouth daily  -     montelukast (SINGULAIR) 10 mg tablet; Take 10 mg by mouth every evening          81 y.o. female with left index finger lesions   Treatment options and expected outcomes were discussed. The patient verbalized understanding of exam findings and treatment plan. The patient was given the opportunity to ask questions. Questions were answered to the patient's satisfaction. Discussed with the patient there is no obvious splinters or foreign body. I also discussed it would be unlikely to have multiple splinters in the finger. A STAT [facility] ultrasound was ordered to evaluate for splinters and foreign body. A STAT referral was also provided to dermatology. Patient advised to keep Vaseline on the finger and open to air. Avoid picking at the finger. Follow up after US to review the results. Follow Up: After testing       To Do Next Visit:  Re-evaluation of current issue        Emir Childs MD  Attending, Orthopaedic Surgery  Hand, Wrist, and Elbow Surgery  [facility]    ____________________________________________________________________________________________________________________________________________      CHIEF COMPLAINT:  Chief Complaint   Patient presents with   • Left Index Finger - Pain       SUBJECTIVE:  Patient is a 81 y.o. ambidextrous female who presents today for evaluation and treatment of left index finger pain.  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 The patient states the splinters are throughout the entire finger. She states these will pop out daily and she will use neosporin and a band-aid. She notes swelling and stiffness about the finger. She is currently on an antibiotic for a sinus infection which she believes is Doxycycline and she has been on it for the past 6 days.            I have personally reviewed all the relevant PMH, PSH, SH, FH, Medications and allergies      PAST MEDICAL HISTORY:  Past Medical History:   Diagnosis Date   • Hypertension    • Hypertension        PAST SURGICAL HISTORY:  Past Surgical History:   Procedure Laterality Date   • APPENDECTOMY     • BACK SURGERY         FAMILY HISTORY:  Family History   Problem Relation Age of Onset   • Heart attack Father    • Stroke Sister    • Diabetes Maternal Grandmother        SOCIAL HISTORY:  Social History     Tobacco Use   • Smoking status: Former   • Smokeless tobacco: Never   Vaping Use   • Vaping Use: Never used   Substance Use Topics   • Alcohol use: Not Currently   • Drug use: Not Currently       MEDICATIONS:    Current Outpatient Medications:   •  carvedilol (COREG) 6.25 mg tablet, , Disp: , Rfl:   •  fluticasone (FLONASE) 50 mcg/act nasal spray, SPRAY 2 SPRAYS INTO EACH NOSTRIL EVERY DAY, Disp: 48 mL, Rfl: 1  •  montelukast (SINGULAIR) 10 mg tablet, Take 10 mg by mouth every evening, Disp: , Rfl:   •  omeprazole (PriLOSEC) 40 MG capsule, TAKE 1 CAPSULE BY MOUTH EVERY DAY, Disp: 90 capsule, Rfl: 1  •  rosuvastatin (CRESTOR) 10 MG tablet, Take 10 mg by mouth daily, Disp: , Rfl:   •  sertraline (ZOLOFT) 50 mg tablet, , Disp: , Rfl:   •  aspirin (ECOTRIN LOW STRENGTH) 81 mg EC tablet, Take 1 tablet (81 mg total) by mouth daily, Disp: 180 tablet, Rfl: 1  •  cyclobenzaprine (FLEXERIL) 5 mg tablet, TAKE 1 TABLET BY ORAL ROUTE 3 TIMES EVERY DAY AS NEEDED, Disp: , Rfl:   •  escitalopram (LEXAPRO) 10 mg tablet, , Disp: , Rfl:   •  ipratropium (ATROVENT) 0.03 % nasal spray, 2 sprays into each nostril every 12 (twelve) hours, Disp: 10 mL, Rfl: 1  •  methylprednisolone (Medrol) 4 mg tablet, Take as directed (patient given instructions) (Patient not taking: Reported on 11/23/2021 ), Disp: 30 tablet, Rfl: 0  •  mometasone (NASONEX) 50 mcg/act nasal spray, 2 sprays into each nostril daily, Disp: 17 g, Rfl: 5    ALLERGIES:  No Known Allergies        REVIEW OF SYSTEMS:  Musculoskeletal:        As noted in HPI. All other systems reviewed and are negative. VITALS:  There were no vitals filed for this visit. LABS:  HgA1c:   Lab Results   Component Value Date    HGBA1C 5.4 05/27/2021     BMP:   Lab Results   Component Value Date    CALCIUM 9.2 09/07/2021    K 4.5 09/07/2021    CO2 30 09/07/2021     09/07/2021    BUN 22 09/07/2021    CREATININE 0.93 09/07/2021       _____________________________________________________  PHYSICAL EXAMINATION:  General: Well developed and well nourished, alert & oriented x 3, appears comfortable  Psychiatric: Normal  HEENT: Normocephalic, Atraumatic Trachea Midline, No torticollis  Pulmonary: No audible wheezing or respiratory distress   Abdomen/GI: Non tender, non distended   Cardiovascular: No pitting edema, 2+ radial pulse   Skin: No masses, erythema, lacerations, fluctation, ulcerations  Neurovascular: Sensation Intact to the Median, Ulnar, Radial Nerve, Motor Intact to the Median, Ulnar, Radial Nerve and Pulses Intact  Musculoskeletal: Normal, except as noted in detailed exam and in HPI. MUSCULOSKELETAL EXAMINATION:  Left index finger  Pulp to palm 3 cm   Palmar surface entire length of finger with scaling plaque   No evidence of foreign body  No erythema or signs of infection   ___________________________________________________  STUDIES REVIEWED:  Xrays of the left index finger were reviewed and independently interpreted in PACS by Dr. Olinda Smith and demonstrate no osseous abnormalities.          PROCEDURES PERFORMED:  Procedures  No Procedures performed today    _____________________________________________________      Scribe Attestation    I,:  Brie Kirk MA am acting as a scribe while in the presence of the attending physician.:       I,:  Jaquelin Man MD personally performed the services described in this documentation    as scribed in my presence.:

## 2023-08-25 ENCOUNTER — OFFICE VISIT (OUTPATIENT)
Dept: OBGYN CLINIC | Facility: CLINIC | Age: 73
End: 2023-08-25
Payer: MEDICARE

## 2023-08-25 VITALS
WEIGHT: 167 LBS | HEART RATE: 67 BPM | OXYGEN SATURATION: 96 % | DIASTOLIC BLOOD PRESSURE: 87 MMHG | BODY MASS INDEX: 26.84 KG/M2 | SYSTOLIC BLOOD PRESSURE: 127 MMHG | HEIGHT: 66 IN

## 2023-08-25 DIAGNOSIS — L98.9 SKIN LESION: Primary | ICD-10-CM

## 2023-08-25 PROCEDURE — 99213 OFFICE O/P EST LOW 20 MIN: CPT | Performed by: STUDENT IN AN ORGANIZED HEALTH CARE EDUCATION/TRAINING PROGRAM

## 2023-08-25 NOTE — PROGRESS NOTES
ORTHOPAEDIC HAND, WRIST, AND ELBOW OFFICE  VISIT       ASSESSMENT/PLAN:      Diagnoses and all orders for this visit:    Skin lesion    Other orders  -     Rosuvastatin Calcium 10 MG CPSP          81 y.o. female with left index finger lesion     US was reviewed which demonstrate no foreign body. Discussed with that patient that this is likely a dermatology issue. A STAT referral was placed at her last visit. Patient was provided with dermatology's contact information today. Follow up with me as needed. Follow Up:  PRN       To Do Next Visit:             Jose Manuel Jaramillo MD  Attending, Orthopaedic Surgery  Hand, Wrist, and Elbow Surgery  [facility]    ____________________________________________________________________________________________________________________________________________      CHIEF COMPLAINT:  Chief Complaint   Patient presents with   • Left Index Finger - Follow-up       SUBJECTIVE:  Patient is a 81 y.o. ambidextrous female who presents today for follow up of left index finger lesions. The patient states the splinters continue to come out. She states last night she pulled 3 out. She states the pain has increased since she stopped picking at the skin. She has been using neosporin. She denies any fever or chills.            I have personally reviewed all the relevant PMH, PSH, SH, FH, Medications and allergies      PAST MEDICAL HISTORY:  Past Medical History:   Diagnosis Date   • Hypertension    • Hypertension        PAST SURGICAL HISTORY:  Past Surgical History:   Procedure Laterality Date   • APPENDECTOMY     • BACK SURGERY         FAMILY HISTORY:  Family History   Problem Relation Age of Onset   • Heart attack Father    • Stroke Sister    • Diabetes Maternal Grandmother        SOCIAL HISTORY:  Social History     Tobacco Use   • Smoking status: Former   • Smokeless tobacco: Never   Vaping Use   • Vaping Use: Never used   Substance Use Topics   • Alcohol use: Not Currently   • Drug use: Not Currently       MEDICATIONS:    Current Outpatient Medications:   •  carvedilol (COREG) 6.25 mg tablet, , Disp: , Rfl:   •  fluticasone (FLONASE) 50 mcg/act nasal spray, SPRAY 2 SPRAYS INTO EACH NOSTRIL EVERY DAY, Disp: 48 mL, Rfl: 1  •  montelukast (SINGULAIR) 10 mg tablet, Take 10 mg by mouth every evening, Disp: , Rfl:   •  omeprazole (PriLOSEC) 40 MG capsule, TAKE 1 CAPSULE BY MOUTH EVERY DAY, Disp: 90 capsule, Rfl: 1  •  Rosuvastatin Calcium 10 MG CPSP, , Disp: , Rfl:   •  sertraline (ZOLOFT) 50 mg tablet, , Disp: , Rfl:   •  aspirin (ECOTRIN LOW STRENGTH) 81 mg EC tablet, Take 1 tablet (81 mg total) by mouth daily, Disp: 180 tablet, Rfl: 1  •  cyclobenzaprine (FLEXERIL) 5 mg tablet, TAKE 1 TABLET BY ORAL ROUTE 3 TIMES EVERY DAY AS NEEDED, Disp: , Rfl:   •  escitalopram (LEXAPRO) 10 mg tablet, , Disp: , Rfl:   •  ipratropium (ATROVENT) 0.03 % nasal spray, 2 sprays into each nostril every 12 (twelve) hours, Disp: 10 mL, Rfl: 1  •  methylprednisolone (Medrol) 4 mg tablet, Take as directed (patient given instructions) (Patient not taking: Reported on 11/23/2021 ), Disp: 30 tablet, Rfl: 0  •  mometasone (NASONEX) 50 mcg/act nasal spray, 2 sprays into each nostril daily, Disp: 17 g, Rfl: 5  •  rosuvastatin (CRESTOR) 10 MG tablet, Take 10 mg by mouth daily (Patient not taking: Reported on 8/25/2023), Disp: , Rfl:     ALLERGIES:  No Known Allergies        REVIEW OF SYSTEMS:  Musculoskeletal:        As noted in HPI. All other systems reviewed and are negative.     VITALS:  Vitals:    08/25/23 0852   BP: 127/87   Pulse: 67   SpO2: 96%       LABS:  HgA1c:   Lab Results   Component Value Date    HGBA1C 5.4 05/27/2021     BMP:   Lab Results   Component Value Date    CALCIUM 9.2 09/07/2021    K 4.5 09/07/2021    CO2 30 09/07/2021     09/07/2021    BUN 22 09/07/2021    CREATININE 0.93 09/07/2021       _____________________________________________________  PHYSICAL EXAMINATION:  General: Well developed and well nourished, alert & oriented x 3, appears comfortable  Psychiatric: Normal  HEENT: Normocephalic, Atraumatic Trachea Midline, No torticollis  Pulmonary: No audible wheezing or respiratory distress   Abdomen/GI: Non tender, non distended   Cardiovascular: No pitting edema, 2+ radial pulse   Skin: No masses, erythema, lacerations, fluctation, ulcerations  Neurovascular: Sensation Intact to the Median, Ulnar, Radial Nerve, Motor Intact to the Median, Ulnar, Radial Nerve and Pulses Intact  Musculoskeletal: Normal, except as noted in detailed exam and in HPI. MUSCULOSKELETAL EXAMINATION:  Left index  Pulp to palm 3 cm  Swelling noted  No erythema or signs of infection   ___________________________________________________  STUDIES REVIEWED:  Ultrasound report was reviewed and demonstrates no foreign body. Ultrasound was independently reviewed by Dr. Tasha Garcia and demonstrates no foreign body.         PROCEDURES PERFORMED:  Procedures  No Procedures performed today    _____________________________________________________      Scribe Attestation    I,:  Brie Kirk MA am acting as a scribe while in the presence of the attending physician.:       I,:  Ju Ramon MD personally performed the services described in this documentation    as scribed in my presence.:

## 2023-09-06 ENCOUNTER — TELEPHONE (OUTPATIENT)
Age: 73
End: 2023-09-06

## 2023-09-06 NOTE — TELEPHONE ENCOUNTER
Called pt and offered Thursday 9/21 at 1 pm in the [address] [place] [address] office, lm to cb to confirm and/or reschedule. (apt on hold).

## 2024-01-24 ENCOUNTER — HOSPITAL ENCOUNTER (OUTPATIENT)
Dept: RADIOLOGY | Facility: HOSPITAL | Age: 74
Discharge: HOME | End: 2024-01-24
Payer: MEDICARE

## 2024-01-24 DIAGNOSIS — J45.30 MILD PERSISTENT ASTHMA, UNSPECIFIED WHETHER COMPLICATED: ICD-10-CM

## 2024-01-24 PROCEDURE — 71046 X-RAY EXAM CHEST 2 VIEWS: CPT

## 2024-02-29 ENCOUNTER — HOSPITAL ENCOUNTER (OUTPATIENT)
Dept: PULMONOLOGY | Facility: HOSPITAL | Age: 74
End: 2024-02-29
Payer: MEDICARE

## 2024-02-29 DIAGNOSIS — J45.30 MILD PERSISTENT ASTHMA, UNSPECIFIED WHETHER COMPLICATED: ICD-10-CM

## 2024-02-29 PROCEDURE — 94760 N-INVAS EAR/PLS OXIMETRY 1: CPT

## 2024-02-29 PROCEDURE — 94726 PLETHYSMOGRAPHY LUNG VOLUMES: CPT | Performed by: INTERNAL MEDICINE

## 2024-02-29 PROCEDURE — 94729 DIFFUSING CAPACITY: CPT | Performed by: INTERNAL MEDICINE

## 2024-02-29 PROCEDURE — 94729 DIFFUSING CAPACITY: CPT

## 2024-02-29 PROCEDURE — 94726 PLETHYSMOGRAPHY LUNG VOLUMES: CPT

## 2024-02-29 PROCEDURE — 94060 EVALUATION OF WHEEZING: CPT

## 2024-02-29 PROCEDURE — 94060 EVALUATION OF WHEEZING: CPT | Performed by: INTERNAL MEDICINE

## 2024-02-29 RX ORDER — ALBUTEROL SULFATE 2.5 MG/3ML
2.5 SOLUTION RESPIRATORY (INHALATION) ONCE
Status: COMPLETED | OUTPATIENT
Start: 2024-02-29 | End: 2024-02-29

## 2024-02-29 RX ADMIN — ALBUTEROL SULFATE 2.5 MG: 2.5 SOLUTION RESPIRATORY (INHALATION) at 09:08
